# Patient Record
Sex: MALE | Race: OTHER | HISPANIC OR LATINO | Employment: OTHER | URBAN - METROPOLITAN AREA
[De-identification: names, ages, dates, MRNs, and addresses within clinical notes are randomized per-mention and may not be internally consistent; named-entity substitution may affect disease eponyms.]

---

## 2024-04-15 ENCOUNTER — APPOINTMENT (OUTPATIENT)
Dept: PAIN MEDICINE | Facility: CLINIC | Age: 89
End: 2024-04-15
Payer: COMMERCIAL

## 2024-04-22 ENCOUNTER — OFFICE VISIT (OUTPATIENT)
Dept: PAIN MEDICINE | Facility: CLINIC | Age: 89
End: 2024-04-22
Payer: OTHER GOVERNMENT

## 2024-04-22 DIAGNOSIS — M51.26 DISPLACEMENT OF LUMBAR INTERVERTEBRAL DISC WITHOUT MYELOPATHY: Primary | ICD-10-CM

## 2024-04-22 PROCEDURE — 99214 OFFICE O/P EST MOD 30 MIN: CPT | Performed by: PHYSICAL MEDICINE & REHABILITATION

## 2024-04-24 NOTE — PROGRESS NOTES
DOL Claim Number:  [id number]  Date of injury:  01/28/1978    Chief complaint  Back and lower limb pain    History  Stephane Bangura is back for pain management office visit  His son Venkatesh presented with him    Ferny Bangura is currently a 89-year-old right-handed male, back for evaluation of his back and lower limb pain.  He worked for the [employer] as a   . At the date of his injury on 01/28/1978, he was lifting a bag of [employer] mail and started to have pain in his back. He went to emergency room, and he was referred to his family doctor. He got x-rays and further imaging.  He then started a course of   physical therapy along with medical management. He did not have any surgery and no injection therapy .  After his symptoms improved he was able to return to work  however, the pain started to get worse again and he could not continue to work.  After that, he worked for a few months on and off, and then he stopped working altogether in 1981 according to what he mentioned     Over the years his pain has been getting progressively worse however he has been able to manage with home exercise program, over-the-counter medication, and gabapentin prescribed by his primary care physician     He describes the pain as  deep and achy across the lower back and the small of the back. This is radiating to the posterior and lateral aspect of the lower limbs. The pain in the lower limbs is of different characteristics than the lower back pain. The pain in the lower limbs is burning with tingling sensation starting at the upper buttocks on both side and radiates to the posterior and lateral aspects of the lower limbs reaching the calves, heels and lateral malleoli into the feet.     Of note that I have been seeing him on the yearly basis for the last 3 to 4 years and the pain characteristics have been similar to before without major changes    The pain is continuous at low pace but it goes  into aggravation with sitting or standing more than 15 minutes, any pushing or pulling aggravates the pain.  Because of the tiredness and fatigability as related to the pain, his ability to ambulate with limited with aggravation of the pain if walks more than 10 minutes and he has to sit down and lean forward to relief the symptoms before he can stand up again and resume ambulation.    He mentioned that he does have weakness in the lower limbs that has been progressively getting worse with time.  This is limiting him but not stopping him from ambulating at least around the house.  In addition, the pain in the back and left leg does wake him up at night on intermittent basis.     He mentioned that he tries to do the home exercises as much as he can for the lower limbs, as well as for the low back area, to try to avoid stiffness and continues to do his stretches.        He rates the pain at 2 and 3/10 at rest but with aggravation the pain goes up to 6 or 7/10 in the back and lower limbs.     He is currently on Gabapentin 400 mg three times a day.  He is also on baby aspirin, Plavix, famotidine, vitamin D 5000 units a day, atorvastatin 20 mg a day, isosorbide ER 30 mg a day, finasteride 5 mg every day.     Review of systems    Denied any fever or chills. No weight loss and no night sweats. No cough or sputum production. No diarrhea   The constipation has been responding to fibers and over the counter medications.     No bladder and bowel incontinence and no other changes in bladder and bowel. No skin changes.  Reports tiredness and fatigability only if the pain is not controlled.   Denied opioids diversion and abuse and denies alcoholism. Denies overuse of the pain medications.    The control of the pain with the   medications is helping the control of the symptoms and allowing the function and activities of daily living, even at basic level enjoyment of life, improving the quality of life and sleep with less  interruption by the pain.      Physical examination  Awake, alert and oriented for time place and persons   declined Chaperone for the visit and was adequately  draped for the exam. His son stayed with him for the entire visit     Head and neck showed normal-colored conjunctivae, no palpable lymph nodes. He is hard of hearing.  And his hearing aid does not work. he was very cooperative with the physical examination.  He was walking with widened perimeter of stance. His gait was slightly unsteady and it was slow.  He uses no assistive devices coming to the office today .    lumbar spine showed reversal of lumbar lordosis and paraspinal atrophy. Straight leg raising increased the pain in the back and down the posterior aspect of the thighs and legs and lateral legs malleoli at 50° bilaterally. The Straight leg raising test was limited by tightness in the soft tissues (hamstrings).   Skin was intact in the lower limbs. Good capillary refill with 3 seconds.    Thigh circumference (Taken 10 cm above the patella) was 36.5 cm bilaterally.  Calf circumference (Taken 10 cm below the tibial tuberosity) was 31 cm bilaterally.  Sensory examination: Sensory to light touch was decreased in the lateral aspect of the legs into dorsum of the feet. Additionally he had decreased sensory to light touch on the back of the calves on both sides.  Motor examination: He had decreased endurance around the hips, but there was no overt focal weakness in the lower limbs, except for big toe extension at 4/5 on both sides. Additionally, plantar and dorsiflexion were 4/5 bilaterally. Deep tendon reflexes were absent for knees and ankles bilaterally. Plantar cutaneous were equivocal on both sides.    Diagnosis  Problem List Items Addressed This Visit       Displacement of lumbar intervertebral disc without myelopathy - Primary        Plan  Reviewed the pain generators.  Went over the types of pain with neuropathic and nociceptive and different  pathologies and therapeutic modalities. Discussed the mechanism of action of interventions from acupuncture, physical therapy , regular exercises, injections, botox, spinal cord stimulation, and role of surgery     Went over pathology of the intervertebral disc displacement and the anatomical relation to the Nerve roots and relation to the radicular symptoms. Went over treatment modalities with conservative treatment including acupuncture   and epidural steroid injection with fluoroscopy guidance and last resort of surgery     Discussed with him about the pathology for intervertebral disc disorder and the treatment plan.  He has not had any injection therapy in the past and there is no need to start that since he is stable.  However I explained to him that we can use epidural steroid injection for any potential aggravation of the pain.  At this point he should continue with gabapentin and home exercise program as much as tolerated.  He is getting the gabapentin from primary care physician.  I went over the exercise program that he is following and I corrected few maneuvers that he was doing while stretching his tight hamstrings    No further intervention needed and no testing regarding the lower condition in the claim is necessary at this time    He wanted me to fill DOL form CA 20 for him and he will give me the forms to fill those for him.      Follow-up after above or earlier if needed     The level of clinical decision making in this office visit,  is high, given the high risks of complications with the morbidity and mortality due to the fact that acute and chronic pain may pose a threat to life and bodily function, if under treated, poorly treated, or with failure to maintain adequate treatment and timely medical follow up. Additionally over treatment has its own set of complications including overdosing on the pain medications and also the habit forming potentials with the use of the medications used to treat  chronic painful conditions including therapeutic classes classified as dangerous medications. Given the serious and fluctuating nature of pain level and instensity with extensive consideration for whenever pain changes, there is always the risk of prolonged functional impairment requiring close patient monitoring with regular assessments and reassessments and high level medical decision making at every office visit. The amount and complexity of data reviewed is high given the patient clinical presentation, labs,  data, radiology reports, and other tests as discussed during office visits. Pertinent data whether positive or negative were taken in consideration in the process of making this high level medical decision.

## 2025-02-27 ENCOUNTER — TELEMEDICINE (OUTPATIENT)
Dept: PAIN MEDICINE | Facility: CLINIC | Age: OVER 89
End: 2025-02-27
Payer: OTHER GOVERNMENT

## 2025-02-27 DIAGNOSIS — M51.26 DISPLACEMENT OF LUMBAR INTERVERTEBRAL DISC WITHOUT MYELOPATHY: Primary | ICD-10-CM

## 2025-02-27 NOTE — PROGRESS NOTES
DOL Claim Number:   [id number]  Date of injury:             01/28/1978    Allowed condition on the claim    lumbar intervetebral disc displacement    Chief complaint  Back and lower limb pain       History  Ferny Bangura is back for pain management office visit  Ferny Bangura was at home.  Could not physically come to the office today he could not travel and is recovering from respiratory infection we did not want to take a chance on him.  I was at the office.  I used secure audiovisual communication provided by the Epic system. Ferny Bangura  agreed on this form of communication and consented to the visit via A/V method.  The patient also understood that this will be billed as office visit.     He continues to have the pain in the back and lower limbs.  I am seeing him for the herniated disc.  He did not have any intervention since the last visit no injections no surgery.  He is on similar situation he is taking gabapentin 400 mg at night this is prescribed by his primary care physician that is helping with the burning pain at night and also helping him to sleep.  No bowel or bladder incontinence.  He is walking with a slight limp but no cane or assistive device.  The pain is across the lower back area deep achy stabbing does interfere with his function.  With this left-sided he is able to function and do his activities of daily living  He is living independently he has people to check on him 2 or 3 times a week    He has not been able to do any work.  He is able to do his function and activities of daily living however he get easily aggravated with the pain in the back of the leg    The pain at rest is 2 or 3/10 with aggravation it goes to 6 or 7/10.      Review of Systems :  Denied any fever or chills. No weight loss and no night sweats. No cough or sputum production. No diarrhea   The constipation has been responding to fibers and over the counter medications.     No bladder and bowel incontinence  and no other changes in bladder and bowel. No skin changes.  Reports tiredness and fatigability only if the pain is not controlled.        Physical examination  Awake, alert and oriented for time place and persons     He was walking and ambulating without a cane slight limp on the left side he had a steady gait    Diagnosis  Problem List Items Addressed This Visit       Displacement of lumbar intervertebral disc without myelopathy - Primary        Plan  Reviewed the pain generators.  Went over the types of pain with neuropathic and nociceptive and different pathologies and therapeutic modalities. Discussed the mechanism of action of interventions from acupuncture, physical therapy , regular exercises, injections, botox, spinal cord stimulation, and role of surgery     Went over pathology of the intervertebral disc displacement and the anatomical relation to the Nerve roots and relation to the radicular symptoms. Went over treatment modalities with conservative treatment including acupuncture   and epidural steroid injection with fluoroscopy guidance and last resort of surgery     I discussed with him that in her situation at this time we will reserve any injection for potential aggravation of the pain however if there is no aggravation we will hold off on intervention he is taking gabapentin at night and Lyrica we can continue to do so.  Continue with gentle stretches exercises he uses Tylenol once or twice a day.  He wanted me to fill CA 20 and I will do that for him      I went over the potential side effects of the NSAIDS on the gastrointestinal, renal and cardiovascular systems.      I detailed the side effects from the acetaminophen in the medication and made aware of those. I also explained about the cumulative effects on the organs and mainly the liver.      Follow-up 3 months or earlier if needed     The level of clinical decision making in this office visit,  is high, given   the serious and fluctuating  nature of pain level and instensity with extensive consideration for whenever pain changes, there is always the risk of prolonged functional impairment requiring close patient monitoring with regular assessments and reassessments and high level medical decision making at every office visit. The amount and complexity of data reviewed is high given the patient clinical presentation, labs,  data, radiology reports, and other tests as discussed during office visits. Pertinent data whether positive or negative were taken in consideration in the process of making this high level medical decision.